# Patient Record
Sex: MALE | Race: ASIAN | Employment: FULL TIME | URBAN - METROPOLITAN AREA
[De-identification: names, ages, dates, MRNs, and addresses within clinical notes are randomized per-mention and may not be internally consistent; named-entity substitution may affect disease eponyms.]

---

## 2021-03-25 ENCOUNTER — HOSPITAL ENCOUNTER (OUTPATIENT)
Age: 46
Discharge: HOME | End: 2021-03-25
Payer: COMMERCIAL

## 2021-03-25 ENCOUNTER — APPOINTMENT (OUTPATIENT)
Dept: GENERAL RADIOLOGY | Age: 46
End: 2021-03-25
Attending: PHYSICIAN ASSISTANT
Payer: COMMERCIAL

## 2021-03-25 VITALS
TEMPERATURE: 100 F | WEIGHT: 172 LBS | BODY MASS INDEX: 26.07 KG/M2 | OXYGEN SATURATION: 98 % | SYSTOLIC BLOOD PRESSURE: 104 MMHG | HEART RATE: 89 BPM | RESPIRATION RATE: 18 BRPM | HEIGHT: 68 IN | DIASTOLIC BLOOD PRESSURE: 79 MMHG

## 2021-03-25 DIAGNOSIS — R50.81 FEVER IN OTHER DISEASES: ICD-10-CM

## 2021-03-25 DIAGNOSIS — Z20.822 LAB TEST NEGATIVE FOR COVID-19 VIRUS: ICD-10-CM

## 2021-03-25 DIAGNOSIS — K59.00 CONSTIPATION, UNSPECIFIED CONSTIPATION TYPE: ICD-10-CM

## 2021-03-25 DIAGNOSIS — R59.0 LEFT CERVICAL LYMPHADENOPATHY: ICD-10-CM

## 2021-03-25 DIAGNOSIS — B34.9 VIRAL SYNDROME: Primary | ICD-10-CM

## 2021-03-25 LAB
POCT GLUCOSE URINE: NEGATIVE MG/DL
POCT INFLUENZA A: NEGATIVE
POCT INFLUENZA B: NEGATIVE
POCT KETONE URINE: NEGATIVE MG/DL
POCT LEUKOCYTE ESTERASE URINE: NEGATIVE
POCT NITRITE URINE: NEGATIVE
POCT PH URINE: 6.5 (ref 5–8)
POCT PROTEIN URINE: 30 MG/DL
POCT RAPID STREP: NEGATIVE
POCT SPECIFIC GRAVITY URINE: 1.02
POCT URINE CLARITY: CLEAR
POCT URINE COLOR: YELLOW
POCT UROBILINOGEN URINE: 0.2 MG/DL
SARS-COV-2 RNA RESP QL NAA+PROBE: NOT DETECTED

## 2021-03-25 PROCEDURE — 87502 INFLUENZA DNA AMP PROBE: CPT | Performed by: PHYSICIAN ASSISTANT

## 2021-03-25 PROCEDURE — 81002 URINALYSIS NONAUTO W/O SCOPE: CPT | Performed by: PHYSICIAN ASSISTANT

## 2021-03-25 PROCEDURE — 74018 RADEX ABDOMEN 1 VIEW: CPT | Performed by: PHYSICIAN ASSISTANT

## 2021-03-25 PROCEDURE — 99203 OFFICE O/P NEW LOW 30 MIN: CPT | Performed by: PHYSICIAN ASSISTANT

## 2021-03-25 PROCEDURE — U0002 COVID-19 LAB TEST NON-CDC: HCPCS | Performed by: PHYSICIAN ASSISTANT

## 2021-03-25 PROCEDURE — 87880 STREP A ASSAY W/OPTIC: CPT | Performed by: PHYSICIAN ASSISTANT

## 2021-03-25 RX ORDER — ACETAMINOPHEN 500 MG
1000 TABLET ORAL ONCE
Status: COMPLETED | OUTPATIENT
Start: 2021-03-25 | End: 2021-03-25

## 2021-03-25 NOTE — ED INITIAL ASSESSMENT (HPI)
Pt c/o fever as high 103.0F over the past 3 days, with fatigue and chills yesterday with body aches that have now resolved. Pt denies CP, SOB, diarrhea, URI s/s, and no loss of taste or smell. Denies any known exposure to anyone with COVID-19.

## 2021-03-25 NOTE — ED PROVIDER NOTES
Patient Seen in: Immediate [address] [place] [address]      History   Patient presents with:  Fever    Stated Complaint: FEVER    HPI/Subjective:   HPI    75-year-old male with a known history of hypertension comes in today complaining of fever as high as 103 normal, clear watery discharge; no sinus tenderness   Throat: Lips, tongue, and mucosa are moist, pink, and intact; teeth intact.  No erythema, no exudates or tonsillar hypertrophy, uvula midline, no trismus or drooling no phonation changes, patient handlin negative strep test at this time the patient has left cervical lymphadenopathy with a viral syndrome I discussed with him that he is contagious until he has been without fever for 24 hours.   Have also discussed with him that if he has any difficulty breath

## 2021-03-28 NOTE — ED NOTES
Spoke with patient to notify him of negative Strep culture of the throat. He reports he is feeling better, no longer having fevers. Instructed to be re-evaluated at [facility] or his PCP if his symptoms return or worsen. He reports understanding.

## 2021-10-04 ENCOUNTER — TELEPHONE (OUTPATIENT)
Dept: INTERNAL MEDICINE | Age: 46
End: 2021-10-04

## 2021-10-04 ENCOUNTER — OFFICE VISIT (OUTPATIENT)
Dept: INTERNAL MEDICINE | Age: 46
End: 2021-10-04

## 2021-10-04 VITALS
DIASTOLIC BLOOD PRESSURE: 72 MMHG | WEIGHT: 168 LBS | BODY MASS INDEX: 25.46 KG/M2 | RESPIRATION RATE: 18 BRPM | HEART RATE: 74 BPM | SYSTOLIC BLOOD PRESSURE: 112 MMHG | OXYGEN SATURATION: 97 % | HEIGHT: 68 IN | TEMPERATURE: 97.7 F

## 2021-10-04 DIAGNOSIS — Z00.00 ANNUAL PHYSICAL EXAM: Primary | ICD-10-CM

## 2021-10-04 PROCEDURE — 99386 PREV VISIT NEW AGE 40-64: CPT | Performed by: INTERNAL MEDICINE

## 2021-10-04 ASSESSMENT — PATIENT HEALTH QUESTIONNAIRE - PHQ9
CLINICAL INTERPRETATION OF PHQ2 SCORE: NO FURTHER SCREENING NEEDED
CLINICAL INTERPRETATION OF PHQ9 SCORE: NO FURTHER SCREENING NEEDED
2. FEELING DOWN, DEPRESSED OR HOPELESS: NOT AT ALL
SUM OF ALL RESPONSES TO PHQ9 QUESTIONS 1 AND 2: 0
1. LITTLE INTEREST OR PLEASURE IN DOING THINGS: NOT AT ALL
SUM OF ALL RESPONSES TO PHQ9 QUESTIONS 1 AND 2: 0

## 2021-10-04 ASSESSMENT — PAIN SCALES - GENERAL: PAINLEVEL: 0

## 2021-10-09 ENCOUNTER — LAB SERVICES (OUTPATIENT)
Dept: LAB | Age: 46
End: 2021-10-09

## 2021-10-09 ENCOUNTER — TELEPHONE (OUTPATIENT)
Dept: INTERNAL MEDICINE | Age: 46
End: 2021-10-09

## 2021-10-09 DIAGNOSIS — Z00.00 ANNUAL PHYSICAL EXAM: Primary | ICD-10-CM

## 2021-10-17 LAB
ALBUMIN SERPL-MCNC: 5 G/DL (ref 3.6–5.1)
ALBUMIN/GLOB SERPL: 1.4 (CALC) (ref 1–2.5)
ALP SERPL-CCNC: 67 U/L (ref 36–130)
ALT SERPL-CCNC: 24 U/L (ref 9–46)
AST SERPL-CCNC: 21 U/L (ref 10–40)
BASOPHILS # BLD AUTO: 37 CELLS/UL (ref 0–200)
BASOPHILS NFR BLD AUTO: 0.5 %
BILIRUB SERPL-MCNC: 0.7 MG/DL (ref 0.2–1.2)
BLASTS # BLD: NORMAL CELLS/UL
BLASTS NFR BLD MANUAL: NORMAL %
BUN SERPL-MCNC: 10 MG/DL (ref 7–25)
BUN/CREAT SERPL: ABNORMAL (CALC) (ref 6–22)
CALCIUM SERPL-MCNC: 10.1 MG/DL (ref 8.6–10.3)
CHLORIDE SERPL-SCNC: 103 MMOL/L (ref 98–110)
CHOLEST SERPL-MCNC: 188 MG/DL
CHOLEST/HDLC SERPL: 4.5 (CALC)
CO2 SERPL-SCNC: 29 MMOL/L (ref 20–32)
CREAT SERPL-MCNC: 1.09 MG/DL (ref 0.6–1.35)
EOSINOPHIL # BLD AUTO: 438 CELLS/UL (ref 15–500)
EOSINOPHIL NFR BLD AUTO: 6 %
ERYTHROCYTE [DISTWIDTH] IN BLOOD BY AUTOMATED COUNT: 12.4 % (ref 11–15)
GLOBULIN SER CALC-MCNC: 3.6 G/DL (CALC) (ref 1.9–3.7)
GLUCOSE SERPL-MCNC: 85 MG/DL (ref 65–99)
HBA1C MFR BLD: 5.3 % OF TOTAL HGB
HCT VFR BLD AUTO: 44.6 % (ref 38.5–50)
HDLC SERPL-MCNC: 42 MG/DL
HGB BLD-MCNC: 15.1 G/DL (ref 13.2–17.1)
LDLC SERPL CALC-MCNC: 113 MG/DL (CALC)
LYMPHOCYTES # BLD AUTO: 2606 CELLS/UL (ref 850–3900)
LYMPHOCYTES NFR BLD AUTO: 35.7 %
MCH RBC QN AUTO: 31.3 PG (ref 27–33)
MCHC RBC AUTO-ENTMCNC: 33.9 G/DL (ref 32–36)
MCV RBC AUTO: 92.5 FL (ref 80–100)
METAMYELOCYTES # BLD: NORMAL CELLS/UL
METAMYELOCYTES NFR BLD MANUAL: NORMAL %
MONOCYTES # BLD AUTO: 511 CELLS/UL (ref 200–950)
MONOCYTES NFR BLD AUTO: 7 %
MYELOCYTES # BLD: NORMAL CELLS/UL
MYELOCYTES NFR BLD MANUAL: NORMAL %
NEUTROPHILS # BLD AUTO: 3708 CELLS/UL (ref 1500–7800)
NEUTROPHILS NFR BLD AUTO: 50.8 %
NEUTS BAND # BLD: NORMAL CELLS/UL (ref 0–750)
NEUTS BAND NFR BLD MANUAL: NORMAL %
NONHDLC SERPL-MCNC: 146 MG/DL (CALC)
NRBC # BLD: NORMAL CELLS/UL
NRBC BLD-RTO: NORMAL /100 WBC
PLATELET # BLD AUTO: 217 THOUSAND/UL (ref 140–400)
PMV BLD REES-ECKER: 11.5 FL (ref 7.5–12.5)
POTASSIUM SERPL-SCNC: 4.6 MMOL/L (ref 3.5–5.3)
PROMYELOCYTES # BLD: NORMAL CELLS/UL
PROMYELOCYTES NFR BLD MANUAL: NORMAL %
PROT SERPL-MCNC: 8.6 G/DL (ref 6.1–8.1)
RBC # BLD AUTO: 4.82 MILLION/UL (ref 4.2–5.8)
SERVICE CMNT-IMP: NORMAL
SODIUM SERPL-SCNC: 139 MMOL/L (ref 135–146)
TRIGL SERPL-MCNC: 214 MG/DL
TSH SERPL-ACNC: 1.59 MIU/L (ref 0.4–4.5)
VARIANT LYMPHS NFR BLD: NORMAL % (ref 0–10)
WBC # BLD AUTO: 7.3 THOUSAND/UL (ref 3.8–10.8)

## 2021-11-11 ENCOUNTER — TELEPHONE (OUTPATIENT)
Dept: INTERNAL MEDICINE | Age: 46
End: 2021-11-11